# Patient Record
Sex: FEMALE | URBAN - METROPOLITAN AREA
[De-identification: names, ages, dates, MRNs, and addresses within clinical notes are randomized per-mention and may not be internally consistent; named-entity substitution may affect disease eponyms.]

---

## 2018-08-14 ENCOUNTER — OFFICE VISIT (OUTPATIENT)
Dept: URBAN - METROPOLITAN AREA CLINIC 76 | Facility: CLINIC | Age: 43
End: 2018-08-14
Payer: COMMERCIAL

## 2018-08-14 PROCEDURE — 92015 DETERMINE REFRACTIVE STATE: CPT | Performed by: OPTOMETRIST

## 2018-08-14 PROCEDURE — 92004 COMPRE OPH EXAM NEW PT 1/>: CPT | Performed by: OPTOMETRIST

## 2018-08-14 ASSESSMENT — KERATOMETRY
OD: 45.25
OS: 45.00

## 2018-08-14 ASSESSMENT — INTRAOCULAR PRESSURE
OD: 13
OS: 13

## 2018-08-14 ASSESSMENT — VISUAL ACUITY
OD: 20/20
OS: 20/20

## 2018-08-14 NOTE — IMPRESSION/PLAN
Impression: Presbyopia: H52.4. Plan: Discussed diagnosis with patient. Dispensed new MRx today. OK to use OTCR only.

## 2019-09-04 ENCOUNTER — OFFICE VISIT (OUTPATIENT)
Dept: URBAN - METROPOLITAN AREA CLINIC 76 | Facility: CLINIC | Age: 44
End: 2019-09-04
Payer: COMMERCIAL

## 2019-09-04 DIAGNOSIS — H52.4 PRESBYOPIA: Primary | ICD-10-CM

## 2019-09-04 DIAGNOSIS — H04.123 DRY EYE SYNDROME OF BILATERAL LACRIMAL GLANDS: ICD-10-CM

## 2019-09-04 DIAGNOSIS — H10.45 OTHER CHRONIC ALLERGIC CONJUNCTIVITIS: ICD-10-CM

## 2019-09-04 PROCEDURE — 92014 COMPRE OPH EXAM EST PT 1/>: CPT | Performed by: OPTOMETRIST

## 2019-09-04 ASSESSMENT — INTRAOCULAR PRESSURE
OS: 20
OD: 19

## 2019-09-04 ASSESSMENT — KERATOMETRY
OD: 45.25
OS: 44.88

## 2019-09-04 ASSESSMENT — VISUAL ACUITY
OS: 20/20
OD: 20/20

## 2019-09-18 ENCOUNTER — TESTING ONLY (OUTPATIENT)
Dept: URBAN - METROPOLITAN AREA CLINIC 76 | Facility: CLINIC | Age: 44
End: 2019-09-18

## 2019-09-18 PROCEDURE — V2799 MISC VISION ITEM OR SERVICE: HCPCS | Performed by: OPTOMETRIST

## 2019-09-18 ASSESSMENT — VISUAL ACUITY
OD: 20/20
OS: 20/20

## 2019-10-08 ENCOUNTER — TESTING ONLY (OUTPATIENT)
Dept: URBAN - METROPOLITAN AREA CLINIC 76 | Facility: CLINIC | Age: 44
End: 2019-10-08

## 2019-10-08 PROCEDURE — V2799 MISC VISION ITEM OR SERVICE: HCPCS | Performed by: OPTOMETRIST

## 2019-10-08 ASSESSMENT — VISUAL ACUITY
OS: 20/20
OD: 20/20

## 2019-10-08 NOTE — IMPRESSION/PLAN
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 pt is basic exophoric. without correction, ortho at near (maybe even a little eso). With near correction, high exophoria at near. Plan: doctor redo. Advise lowest add tolerable for NVA. Higher add will increase exo. Dist correction should be good for computer distance. Call with concerns.